# Patient Record
Sex: MALE | Race: WHITE
[De-identification: names, ages, dates, MRNs, and addresses within clinical notes are randomized per-mention and may not be internally consistent; named-entity substitution may affect disease eponyms.]

---

## 2019-11-30 ENCOUNTER — HOSPITAL ENCOUNTER (EMERGENCY)
Dept: HOSPITAL 59 - ER | Age: 59
Discharge: HOME | End: 2019-11-30
Payer: SELF-PAY

## 2019-11-30 DIAGNOSIS — S61.217A: Primary | ICD-10-CM
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
## 2019-11-30 PROCEDURE — 96372 THER/PROPH/DIAG INJ SC/IM: CPT

## 2019-11-30 PROCEDURE — 99283 EMERGENCY DEPT VISIT LOW MDM: CPT

## 2019-11-30 PROCEDURE — 12001 RPR S/N/AX/GEN/TRNK 2.5CM/<: CPT

## 2019-11-30 PROCEDURE — 90715 TDAP VACCINE 7 YRS/> IM: CPT

## 2019-11-30 NOTE — EMERGENCY DEPARTMENT RECORD
History of Present Illness





- General


Chief Complaint: Laceration(s)


Stated Complaint: FINGER LACERATION


Time Seen by Provider: 19 11:58


Source: Patient


Mode of Arrival: Ambulatory





- History of Present Illness


Initial Commments: 


2 cm laceration of his finger left 5th on the volar pad and caught between rifle

and block of wood. Patient refused xray 





Onset/Timin


-: Minutes(s)


Place: Home


Context: Accidental


Associated Symptoms: None





- [place] Coma Scale


Eye Response: (4) Open spontaneously


Motor Response: (6) Obeys commands


Verbal Response: (5) Oriented


Sudarshan Total: 15





- Related Data


                                    Allergies











Allergy/AdvReac Type Severity Reaction Status Date / Time


 


No Known Drug Allergies Allergy   Unverified 19 10:57














Travel Screening





- Travel/Exposure Within Last 30 Days


Have you traveled within the last 30 days?: Yes


Location Detail:: [place]





- Travel/Exposure Within Last Year


Have you traveled outside the [place] in the last year?: No





- Additonal Travel Details


Have you been exposed to anyone with a communicable illness?: No





- Travel Symptoms


Symptom Screening: None





Review of Systems


Reviewed: No additional complaints except as noted below


Constitutional: Reports: As per HPI.  Denies: Chills, Fever, Malaise, Night 

sweats, Weakness, Weight change


Eyes: Reports: As per HPI.  Denies: Eye discharge, Eye pain, Photophobia, Vision

change


ENT: Reports: As per HPI.  Denies: Congestion, Dental pain, Ear pain, Epistaxis,

Hearing loss, Throat pain


Respiratory: Reports: As per HPI.  Denies: Cough, Dyspnea, Hemoptysis, Stridor, 

Wheezes


Cardiovascular: Reports: As per HPI.  Denies: Arrhythmia, Chest pain, Dyspnea on

exertion, Edema, Murmurs, Orthopnea, Palpitations, Paroxysmal nocturnal dyspnea,

Rheumatic Fever, Syncope


Endocrine: Reports: As per HPI.  Denies: Fatigue, Heat or cold intolerance, 

Polydipsia, Polyuria


Gastrointestinal: Reports: As per HPI.  Denies: Abdominal pain, Constipation, 

Diarrhea, Hematemesis, Hematochezia, Melena, Nausea, Vomiting


Genitourinary: Reports: As per HPI.  Denies: Dysuria, Frequency, Hematuria, 

Incontinence, Retention, Testicular pain, Testicular mass, Urgency


Musculoskeletal: Reports: As per HPI.  Denies: Arthralgia, Back pain, Gout, 

Joint swelling, Myalgia, Neck pain


Skin: Reports: As per HPI.  Denies: Bruising, Change in color, Change in 

hair/nails, Lesions, Pruritus, Rash


Neurological: Reports: As per HPI.  Denies: Abnormal gait, Confusion, Headache, 

Numbness, Paresthesias, Seizure, Tingling, Tremors, Vertigo, Weakness


Psychiatric: Reports: As per HPI.  Denies: Anxiety, Auditory hallucinations, 

Depression, Homicidal thoughts, Suicidal thoughts, Visual hallucinations


Hematological/Lymphatic: Reports: As per HPI.  Denies: Anemia, Blood Clots, Easy

bleeding, Easy bruising, Swollen glands





Past Medical History





- SOCIAL HISTORY


Smoking Status: Never smoker


Alcohol Use: Heavy


Drug Use: None





- RESPIRATORY


Hx Respiratory Disorders: No





- CARDIOVASCULAR


Hx Cardio Disorders: No





- NEURO


Hx Neuro Disorders: No





- GI


Hx GI Disorders: No





- 


Hx Genitourinary Disorders: No





- ENDOCRINE


Hx Endocrine Disorders: No





- MUSCULOSKELETAL


Hx Musculoskeletal Disorders: Yes


Hx Gout: Yes





- PSYCH


Hx Psych Problems: No





- HEMATOLOGY/ONCOLOGY


Hx Hematology/Oncology Disorders: No





Family Medical History


Any Significant Family History?: No


Hx Cancer: Father, Brother/Sister


Hx Heart Disease: Grandparents


Hx Stroke: Mother, Grandparents





Physical Exam





- General


General Appearance: Alert, Oriented x3, Cooperative, No acute distress





- Head


Head exam: Normal inspection





- Eye


Eye exam: Normal appearance, PERRL


Pupils: Normal accommodation





- ENT


ENT exam: Normal exam, Mucous membranes moist, Normal external ear exam, Normal 

orophraynx, TM's normal bilaterally


Ear exam: Normal external inspection.  negative: External canal tenderness


Nasal Exam: Normal inspection.  negative: Discharge, Sinus tenderness


Mouth exam: Normal external inspection, Tongue normal


Teeth exam: Normal inspection.  negative: Dental caries


Throat exam: Normal inspection.  negative: Tonsillar erythema, Tonsillar exudate





- Neck


Neck exam: Normal inspection, Full ROM.  negative: Tenderness





- Respiratory


Respiratory exam: Normal lung sounds bilaterally.  negative: Respiratory 

distress





- Cardiovascular


Cardiovascular Exam: Regular rate, Normal rhythm, Normal heart sounds





- GI/Abdominal


GI/Abdominal exam: Soft, Normal bowel sounds.  negative: Tenderness





- Rectal


Rectal exam: Deferred





- 


 exam: Deferred





- Extremities


Extremities exam: Full ROM, Normal capillary refill, Tenderness, Other (2 cm 

laceration of fith finger,2 pt sensation good and ROM good some mallet deformity

but able to straighten it, advised risk of not xraying)





- Back


Back exam: Reports: Normal inspection, Full ROM.  Denies: Muscle spasm, Rash 

noted, Tenderness





- Neurological


Neurological exam: Alert, Normal gait, Oriented X3, Reflexes normal





- Psychiatric


Psychiatric exam: Normal affect, Normal mood





- Skin


Skin exam: Dry, Intact, Normal color, Warm





Course





                                   Vital Signs











  19





  11:42


 


Pulse Rate 70


 


Respiratory 16





Rate 


 


Blood Pressure 150/96


 


Pulse Ox 96














- Reevaluation(s)


Reevaluation #1: 


laceration repair 


1% lidociane digital block 


cleaned with surclens and repaired with 5.0 ethilon times three sutures


19 12:40








Disposition


Clinical Impression: 


Laceration of finger


Qualifiers:


 Encounter type: initial encounter Finger: little finger Damage to nail status: 

without damage Foreign body presence: without foreign body Laterality: left Qu

alified Code(s): S61.217A - Laceration without foreign body of left little 

finger without damage to nail, initial encounter





Condition: (1) Good


Instructions:  Laceration (ED)


Additional Instructions: 


sutures out in 10 days


Forms:  Patient Portal Access


Time of Disposition: 12:42





Quality





- Quality Measures


Quality Measures: N/A





- Blood Pressure Screening


Does Patient Have Any of the Following: No


Blood Pressure Classification: Hypertensive Reading


Systolic Measurement: 150


Diastolic Measurement: 96


Screening for High Blood Pressure: < First Hypertensive BP, F/U Documented > 

[]


First Hypertensive Follow-up Interventions: Referral to alternative/primary care

 provider.

## 2019-12-09 ENCOUNTER — HOSPITAL ENCOUNTER (EMERGENCY)
Dept: HOSPITAL 59 - ER | Age: 59
Discharge: HOME | End: 2019-12-09
Payer: SELF-PAY

## 2019-12-09 DIAGNOSIS — Z48.02: Primary | ICD-10-CM

## 2019-12-09 NOTE — EMERGENCY DEPARTMENT RECORD
History of Present Illness





- General


Chief Complaint: Suture removal


Stated Complaint: SUTURE REMOVAL


Time Seen by Provider: 12/09/19 15:13


Source: Patient


Mode of arrival: Ambulatory


Limitations: No limitations





- History of Present Illness


Initial Comments: 


The patient is here for suture removal. He denies any problems.


MD Complaint: Suture/staple removal


Onset/Timing: 10


-: Days(s)


Initial Visit For: Laceration


Symptoms Since Prior Visit: No new symptoms


Associated Symptoms: None





- Related Data


                                    Allergies











Allergy/AdvReac Type Severity Reaction Status Date / Time


 


No Known Drug Allergies Allergy   Verified 12/09/19 15:31














Travel Screening





- Travel/Exposure Within Last 30 Days


Have you traveled within the last 30 days?: No





Review of Systems


Constitutional: Denies: Chills, Fever





Past Medical History





- SOCIAL HISTORY


Smoking Status: Never smoker


Alcohol Use: None


Drug Use: None





- RESPIRATORY


Hx Respiratory Disorders: No





- CARDIOVASCULAR


Hx Cardio Disorders: No





- NEURO


Hx Neuro Disorders: No





- GI


Hx GI Disorders: No





- 


Hx Genitourinary Disorders: No





- ENDOCRINE


Hx Endocrine Disorders: No





- MUSCULOSKELETAL


Hx Musculoskeletal Disorders: Yes


Hx Gout: Yes





- PSYCH


Hx Psych Problems: No





- HEMATOLOGY/ONCOLOGY


Hx Hematology/Oncology Disorders: No





Family Medical History


Any Significant Family History?: Yes


Hx Cancer: Father, Brother/Sister


Hx Heart Disease: Grandparents


Hx Stroke: Mother, Grandparents





Physical Exam





- General


General Appearance: Alert, Cooperative





- Extremities


Extremities exam: negative: Normal inspection (The L finger laceration is well 

healed. The 3 sutures were removed without difficulty.)





Course





                                   Vital Signs











  12/09/19





  15:29


 


Temperature 97.7 F


 


Pulse Rate 85


 


Respiratory 18





Rate 


 


Blood Pressure 146/100


 


Pulse Ox 99














Disposition


Disposition: Discharge


Clinical Impression: 


 Encounter for removal of sutures





Disposition: Home, Self-Care


Condition: (2) Stable


Instructions:  Stitches Removal (ED)


Additional Instructions: 


Return to the ER for any problems.


Forms:  Patient Portal Access


Time of Disposition: 15:33





Quality





- Quality Measures


Quality Measures: N/A





- Blood Pressure Screening


View Details: Yes


Does Patient Have Any of the Following: No


Blood Pressure Classification: Hypertensive Reading


Systolic Measurement: 146


Diastolic Measurement: 100


Screening for High Blood Pressure: < First Hypertensive BP, F/U Documented > 

[]


First Hypertensive Follow-up Interventions: Referral to alternative/primary care

 provider.